# Patient Record
Sex: FEMALE | Race: WHITE
[De-identification: names, ages, dates, MRNs, and addresses within clinical notes are randomized per-mention and may not be internally consistent; named-entity substitution may affect disease eponyms.]

---

## 2019-08-27 ENCOUNTER — HOSPITAL ENCOUNTER (OUTPATIENT)
Dept: HOSPITAL 84 - D.OPS | Age: 69
Discharge: HOME | End: 2019-08-27
Attending: RADIOLOGY
Payer: MEDICARE

## 2019-08-27 VITALS — HEIGHT: 62 IN | WEIGHT: 135.28 LBS | BODY MASS INDEX: 24.89 KG/M2 | BODY MASS INDEX: 24.89 KG/M2

## 2019-08-27 VITALS — DIASTOLIC BLOOD PRESSURE: 65 MMHG | SYSTOLIC BLOOD PRESSURE: 114 MMHG

## 2019-08-27 DIAGNOSIS — S32.019A: Primary | ICD-10-CM
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
## 2019-08-27 LAB
APPEARANCE UR: CLEAR
BILIRUB SERPL-MCNC: NEGATIVE MG/DL
COLOR UR: YELLOW
GLUCOSE SERPL-MCNC: NEGATIVE MG/DL
KETONES UR STRIP-MCNC: NEGATIVE MG/DL
NITRITE UR-MCNC: NEGATIVE MG/ML
PH UR STRIP: 5 [PH] (ref 5–6)
PROT UR-MCNC: NEGATIVE MG/DL
SP GR UR STRIP: 1.01 (ref 1–1.02)
UROBILINOGEN UR-MCNC: NORMAL MG/DL

## 2019-09-03 ENCOUNTER — HOSPITAL ENCOUNTER (OUTPATIENT)
Dept: HOSPITAL 84 - D.SP | Age: 69
Discharge: HOME | End: 2019-09-03
Attending: RADIOLOGY
Payer: MEDICARE

## 2019-09-03 VITALS — WEIGHT: 135.28 LBS | HEIGHT: 62 IN | BODY MASS INDEX: 24.89 KG/M2 | BODY MASS INDEX: 24.89 KG/M2

## 2019-09-03 DIAGNOSIS — M80.88XA: Primary | ICD-10-CM

## 2019-09-03 DIAGNOSIS — Z01.812: ICD-10-CM

## 2019-09-03 LAB
ANION GAP SERPL CALC-SCNC: 13.1 MMOL/L (ref 8–16)
APTT BLD: 27.4 SECONDS (ref 22.8–39.4)
BASOPHILS NFR BLD AUTO: 0.5 % (ref 0–2)
BUN SERPL-MCNC: 22 MG/DL (ref 7–18)
CALCIUM SERPL-MCNC: 9.9 MG/DL (ref 8.5–10.1)
CHLORIDE SERPL-SCNC: 99 MMOL/L (ref 98–107)
CO2 SERPL-SCNC: 29.8 MMOL/L (ref 21–32)
CREAT SERPL-MCNC: 1.2 MG/DL (ref 0.6–1.3)
EOSINOPHIL NFR BLD: 1.2 % (ref 0–7)
ERYTHROCYTE [DISTWIDTH] IN BLOOD BY AUTOMATED COUNT: 12.6 % (ref 11.5–14.5)
GLUCOSE SERPL-MCNC: 182 MG/DL (ref 74–106)
HCT VFR BLD CALC: 43 % (ref 36–48)
HGB BLD-MCNC: 15.1 G/DL (ref 12–16)
IMM GRANULOCYTES NFR BLD: 0.2 % (ref 0–5)
INR PPP: 1.04 (ref 0.85–1.17)
LYMPHOCYTES NFR BLD AUTO: 35.2 % (ref 15–50)
MCH RBC QN AUTO: 31 PG (ref 26–34)
MCHC RBC AUTO-ENTMCNC: 35.1 G/DL (ref 31–37)
MCV RBC: 88.3 FL (ref 80–100)
MONOCYTES NFR BLD: 7.1 % (ref 2–11)
NEUTROPHILS NFR BLD AUTO: 55.8 % (ref 40–80)
OSMOLALITY SERPL CALC.SUM OF ELEC: 283 MOSM/KG (ref 275–300)
PLATELET # BLD: 254 10X3/UL (ref 130–400)
PMV BLD AUTO: 9.7 FL (ref 7.4–10.4)
POTASSIUM SERPL-SCNC: 3.9 MMOL/L (ref 3.5–5.1)
PROTHROMBIN TIME: 13.1 SECONDS (ref 11.6–15)
RBC # BLD AUTO: 4.87 10X6/UL (ref 4–5.4)
SODIUM SERPL-SCNC: 138 MMOL/L (ref 136–145)
WBC # BLD AUTO: 8.8 10X3/UL (ref 4.8–10.8)

## 2019-09-03 NOTE — HEMODYNAMI
PATIENT:MIKE OWENS                               MEDICAL RECORD: [id number]
: 46                                            LOCATION:[facility]           
ACCT# [id number]                                       ADMISSION DATE: 19
 
 
 Generatedon:9/3/074802:13
Patient name: MIKE OWENS Patient #: [id number] Visit #: [id number] SSN: 
: 1950
Date of study: 9/3/2019
Page: Of
Hemodynamic Procedure Report
****************************
Patient Data
Patient Demographics
Procedure consent was obtained
First Name: MIKE           Gender: Female
Last Name: GRACIELA          : 1950
[facility] Initial: ADRIENNE        Age: 69 year(s)
Patient #: [id number]       Race: Unknown
Visit #: [id number]
Accession #:
[id number]
Additional ID: [id number]
Contact details
Address: [place] [address]           Phone: [phone]
State: [place]
City: [place]
Zip code: [address]
Past Medical History
Allergies
Allergen        Reaction        Date         Comments
Reported
Penicillins                     9/3/2019
Admission
Admission Data
Admission Date: 9/3/2019    Admission Time: 9:10
Procedure
Procedure Types
Cath Procedure
Peripheral Cath Diagnostic Procedure
Kyphoplasty
Kyphoplasty Lumbar
Procedure Description
Procedure Date
Procedure Date: 9/3/2019
Procedure Start Time: 12:32
Procedure End Time: 13:13
Procedure Staff
Name                            Function
Harris Garcia MD               Performing Physician
Davian Charlton RT              Scrub
Brenda Connor RT              Monitor
Salena Moreno RN                 Nurse
Nahomi Xiong RN                Nurse
Procedure Data
Cath Procedure
Fluoroscopy
Diagnostic fluoroscopy      Total fluoroscopy Time: 6.7
time: 6.7 min               min
 
Diagnostic fluoroscopy      Total fluoroscopy dose: 382
dose: 382 mGy               mGy
Hemodynamics
Rest
Heart Rate: 79 (bpm)
Snapshots
Pre Cath      Intra         NCS           Post Cath
Vital Signs
Time     Heart  Resp   SPO2 etCO2   NIBP (mmHg) Rhythm  Pain    Sedation
Rate   (ipm)  (%)  (mmHg)                      Status  Level
(bpm)
12:12:03 77     10     100  34.5    136/80(104) NSR     0 (11)  10(A)
, No
pain
12:16:09 82     20     98   30.8    144/82(113) NSR     0 (11)  10(A)
, No
pain
12:20:23 75     17     96   30.1    113/64(84)  NSR     0 (11)  10(A)
, No
pain
12:24:27 71     16     98   30      113/65(83)  NSR     0 (11)  10(A)
, No
pain
12:28:31 68     13     98   24      112/64(87)  NSR     0 (11)  10(A)
, No
pain
12:33:30 93     16     95   0       Measuring   NSR     0 (11)  10(A)
, No
pain
12:34:46 92     24     94   13.5    Disturbed   NSR     0 (11)  10(A)
, No
pain
12:37:29 82     21     94   24.7    110/64(82)  NSR     0 (11)  10(A)
, No
pain
12:41:35 77     19     95   27      93/57(67)   NSR     0 (11)  10(A)
, No
pain
12:45:37 74     18     94   22.5    94/52(66)   NSR     0 (11)  10(A)
, No
pain
12:49:39 71     18     95   21.8    90/55(65)   NSR     0 (11)  10(A)
, No
pain
12:53:38 69     17     94   27      87/52(70)   NSR     0 (11)  10(A)
, No
pain
12:57:38 67     18     94   20.2    86/50(63)   NSR     0 (11)  10(A)
, No
pain
13:02:31 75     16     95   25.5    109/65(83)  NSR     0 (11)  10(A)
, No
pain
13:06:34 75     16     96   27      97/60(75)   NSR     0 (11)  10(A)
, No
pain
13:10:18 85     16          28.5    106/94(96)  NSR     0 (11)  10(A)
, No
pain
Procedure Log
 
Time     Note
12:00:56 Salena Moreno RN sent for patient. Start room use.
12:01:08 Use device set IR Diagnostic
12:01:11 Bag Decanter (2002S) opened to sterile field.
12:01:12 Sterile Angiographic Pack opened to sterile field.
12:01:12 Tegaderm 4 x 4 (1626W) opened to sterile field.
12:01:23 Plan of Care:Hemodynamics will remain stable., Cardiac rhythm will
remain stable., Comfort level will be maintained., Respiratory function
will remain adequate., Patient/ family verbilizes understanding of
procedure., Procedure tolerated without complication., Recovers from
procedure without complications..
12:01:31 Patient received from Outpatients to IR Alert and oriented. Tansferred
to table in Prone position.
12:01:35 Signed procedure consent form obtained from patient.
12:01:37 Correct patient and procedure confirmed by team.
12::39 Full Disclosure recording started
12::39 -----------------------------------------------------------------------
-
12:01:45 H&P Date Dictated: 9/3/2019 H&P Addendum completed by physician on day
of procedure. (MUST COMPLETE FOR ALL OUTPATIENTS).
12:01:47 Pre-procedure instructions explained to patient.
12::47 Pre-op teaching completed and patient verbalized understanding.
12:01:49 -----------------------------------------------------------------------
-
12:02:05 SEE ANESTHESIA NOTE FOR PRE PROCDEDURE TIVA
12:02:17 Lumbar area was prepped with dura-prep and draped in sterile fashion
12:02:20 Alarms reviewed by R. N.
12:02:21 Sharps counted by scrub and verified by R.N.
12:10:54 Vital chart was started
12:10:56 Baseline sample Acquired.
12:29:01 Patient allergic to Penicillins
12:29:41 Is the patient allergic to Iodine/contrast media? No.
12:30:26 Physician arrived
12::27 --------ALL STOP TIME OUT------
12:30:34 Final Timeout: patient, procedure, and site verified with staff and
physician. All members of the team are in agreement.
12::43 Lumbar site verified by team.
12:31:14 SHALONDA AUTOPLEX MIXER W/VHV opened to sterile field.
12:32:10 Shalonda 11G Curved Needle opened to sterile field.
12:32:11 SHALONDA CANNULA ACCESS 10 G NEEDLE opened to sterile field.
12:32:27 Procedure started.
12:34:23 Sedation plan: TIVA Medication:Propofol, Lidocaine
12:40:42 Local is given by Dr. Garcia At L1 area.
12:46:19 Bone bx needle placed.
12:51:13 Kyphoplasty balloon introduced.
12:55:13 Cement introduced to vertebral body.
12:58:49 The needle is removed.
12:59:26 Procedure ended.(Physican Out)
13:01:00 Fluoroscopy time 06.70 minutes.
13:01:08 Fluoroscopy dose: 382 mGy
13:01:08 Flurop Dose total: 382
13:01:25 Dose Area Product 09255 mGy/cm.
13:01:51 Sharps counted by scrub and verified.
13:02:31 Post Lumbar area:stable
13:02:47 Patient needs reinforcement of post procedure teaching.
13:03:07 Post-op/insertion site middle Lumbar area dressed using a 4 x 4 and
Tegaderm.
13:04:03 SEE ANESTHESIA POST ASSEMENT NOTE FOR PROCEDURE.
13:04:08 Procedure and supply charges have been captured, reviewed, submitted an
d
 
are correct.
13:12:21 See physician's report for complete and final results.
13:12:52 Report given to Outpatients.
13:13:06 Patient transfered to Outpatients with Stretcher.
13:13:15 Procedure ended.
13:13:15 Full Disclosure recording stopped
13:13:54 Vital chart was stopped
Device Usage
Item Name    Manufacture  Quantity  Catalog       Hospital Part    Current Minim
al Lot# /
Number        Charge   Number  Stock   Stock   Serial#
Code
Bag Decanter [employer]     1                  [id number]   [id number]   [id number]  5
()      [employer]
Sterile      Cardinal     1         [id number]    [id number]           [id number]  5
[employer]
Pack
Tegaderm 4 x 3M           1         1626W         [id number]   [id number]  [id number]  5
4 (1626W)
SHALONDA      Shalonda      1         [phone]  281740   93726   [id number]  5
AUTOPLEX
MIXER W/VHV
Shalonda 11G  [place]      1         [phone]  [id number]           [id number]  5
Curved
Needle
SHALONDA      [place]      1         [phone]  817732   39577   [id number]  5
CANNULA
ACCESS 10 G
NEEDLE
Signature Audit [place]
Stage           Time        Signature      Unsigned
Intra-Procedure 9/3/2019    Brenda
1:13:51 PM  Geeta
RT(R) (CV)
Signatures
Monitor : Brenda         Signature :
Geeta RT            ______________________________
Date : ______________ Time :
______________
 
 
 
 
 
 
 
 
 
 
 
 
 
 
 
 
[facility]                                          
[address] [place] [address]

## 2019-09-03 NOTE — NUR
1430 MOVES ALL EXTREMITIES WELL, GOOD SENSATION AND BILATERAL FREX
AND DISTENTION OF LUISA LOWER EXTREMITIES